# Patient Record
Sex: FEMALE | Race: WHITE | Employment: UNEMPLOYED | URBAN - METROPOLITAN AREA
[De-identification: names, ages, dates, MRNs, and addresses within clinical notes are randomized per-mention and may not be internally consistent; named-entity substitution may affect disease eponyms.]

---

## 2019-07-11 PROBLEM — Z47.89 ORTHOPEDIC AFTERCARE: Status: ACTIVE | Noted: 2019-07-11

## 2022-02-09 PROBLEM — M47.816 LUMBAR FACET ARTHROPATHY: Status: ACTIVE | Noted: 2022-02-09

## 2022-02-09 PROBLEM — M47.816 LUMBAR SPONDYLOSIS: Status: ACTIVE | Noted: 2022-02-09

## 2022-03-21 ENCOUNTER — TELEPHONE (OUTPATIENT)
Dept: ORTHOPEDICS CLINIC | Facility: CLINIC | Age: 62
End: 2022-03-21

## 2022-03-21 NOTE — TELEPHONE ENCOUNTER
Pt stated her pain is in the front at the joint. No previous imaging completed. Xrays ordered. Pt called and notified to come 20-30 minutes before the appointment to have those completed. Pt verbalized understanding. No further questions at this time.

## 2022-12-06 ENCOUNTER — TELEPHONE (OUTPATIENT)
Dept: ORTHOPEDICS CLINIC | Facility: CLINIC | Age: 62
End: 2022-12-06

## 2022-12-06 DIAGNOSIS — M25.551 HIP PAIN, RIGHT: Primary | ICD-10-CM

## 2022-12-06 NOTE — TELEPHONE ENCOUNTER
Patient is coming in for RT HIP. At this time patient has not had any imaging done. Please place X-ray order accordingly, I have notified the patient to  come in earlier prior to appointment to have imaging done. Please forward to Renato Holliday and help schedule xrays. Thank you.      Future Appointments   Date Time Provider Alysa Sofia   1/4/2023  4:00 PM Indra Arnold MD [facility]

## 2023-01-09 ENCOUNTER — TELEPHONE (OUTPATIENT)
Dept: ORTHOPEDICS CLINIC | Facility: CLINIC | Age: 63
End: 2023-01-09

## 2023-01-09 DIAGNOSIS — M25.511 BILATERAL SHOULDER PAIN, UNSPECIFIED CHRONICITY: Primary | ICD-10-CM

## 2023-01-09 DIAGNOSIS — M25.512 BILATERAL SHOULDER PAIN, UNSPECIFIED CHRONICITY: Primary | ICD-10-CM

## 2023-01-09 NOTE — TELEPHONE ENCOUNTER
NP Right Hip Pain and Bilateral Shoulder Pain Please advise if imaging is needed.   Future Appointments   Date Time Provider Alysa Kimberlyn   2/8/2023  2:20 PM Daryl Sherman MD [facility]   2/15/2023 11:00 AM Daryl Sherman MD [facility]

## 2023-02-08 ENCOUNTER — OFFICE VISIT (OUTPATIENT)
Dept: ORTHOPEDICS CLINIC | Facility: CLINIC | Age: 63
End: 2023-02-08
Payer: MEDICARE

## 2023-02-08 ENCOUNTER — HOSPITAL ENCOUNTER (OUTPATIENT)
Dept: GENERAL RADIOLOGY | Age: 63
Discharge: HOME | End: 2023-02-08
Attending: ORTHOPAEDIC SURGERY
Payer: MEDICARE

## 2023-02-08 VITALS — WEIGHT: 190 LBS | HEIGHT: 66 IN | BODY MASS INDEX: 30.53 KG/M2

## 2023-02-08 DIAGNOSIS — M25.551 HIP PAIN, RIGHT: ICD-10-CM

## 2023-02-08 DIAGNOSIS — M43.17 SPONDYLOLISTHESIS AT L5-S1 LEVEL: ICD-10-CM

## 2023-02-08 DIAGNOSIS — M70.61 TROCHANTERIC BURSITIS OF RIGHT HIP: Primary | ICD-10-CM

## 2023-02-08 DIAGNOSIS — Z96.641 HISTORY OF RIGHT HIP REPLACEMENT: ICD-10-CM

## 2023-02-08 PROCEDURE — 73502 X-RAY EXAM HIP UNI 2-3 VIEWS: CPT | Performed by: ORTHOPAEDIC SURGERY

## 2023-02-08 PROCEDURE — 20610 DRAIN/INJ JOINT/BURSA W/O US: CPT | Performed by: ORTHOPAEDIC SURGERY

## 2023-02-08 PROCEDURE — 99203 OFFICE O/P NEW LOW 30 MIN: CPT | Performed by: ORTHOPAEDIC SURGERY

## 2023-02-08 PROCEDURE — 3008F BODY MASS INDEX DOCD: CPT | Performed by: ORTHOPAEDIC SURGERY

## 2023-02-08 RX ORDER — HYDROCODONE BITARTRATE AND ACETAMINOPHEN 5; 325 MG/1; MG/1
TABLET ORAL
COMMUNITY
Start: 2023-02-01

## 2023-02-08 RX ORDER — TRIAMCINOLONE ACETONIDE 40 MG/ML
40 INJECTION, SUSPENSION INTRA-ARTICULAR; INTRAMUSCULAR ONCE
Status: COMPLETED | OUTPATIENT
Start: 2023-02-08 | End: 2023-02-08

## 2023-02-08 RX ADMIN — TRIAMCINOLONE ACETONIDE 40 MG: 40 INJECTION, SUSPENSION INTRA-ARTICULAR; INTRAMUSCULAR at 15:03:00

## 2023-02-08 NOTE — PROGRESS NOTES
Patient is a 41-year-old female here for evaluation of her right hip. Patient states that she had a total hip arthroplasty performed approximately 9 years ago and that the hip has done fairly well but she has had some discomfort with the hip pretty much since the surgery but substantially better than it was before the procedure. Patient does note that recently she had a fall out of a bed landing onto her right hip having some pain. Patient denies groin pain. Patient states the pain is predominantly associated with lying on her right side. She does have some pain however with motion. In addition to this problem she also has been having some pain down her right leg associated with her back. She has had previous x-rays and was told she had spinal stenosis. Patient has been advised to consider surgery. Presently though she is can be going to physical therapy. Patient's exam shows her to have mild to moderate tenderness over the trochanteric bursal region of the right hip. Passive range of motion of the right hip produces no pain. She has a minimal Trendelenburg gait. Neurologically she is intact to light touch lower extremities. Motor exam grossly 5/5. X-rays of her right hip shows the prosthesis to be in good position. Left hip shows minimal degenerative changes. Mild spurring at the trochanteric region of the right hip. Impression is that of trochanteric bursitis of the right hip. Second impression is that of stable right total hip arthroplasty. Third impression is that of spondylolisthesis L5-S1 with stenosis. Recommendation is to go ahead with a cortisone injection in the bursa which was done under sterile technique with 2 cc of Xylocaine and Marcaine and 40 mg of Kenalog. Additionally I recommended that she sees one of the spine specialist to discuss various options.

## 2023-02-13 ENCOUNTER — TELEPHONE (OUTPATIENT)
Dept: ORTHOPEDICS CLINIC | Facility: CLINIC | Age: 63
End: 2023-02-13

## 2023-02-13 DIAGNOSIS — M54.50 LOW BACK PAIN, UNSPECIFIED BACK PAIN LATERALITY, UNSPECIFIED CHRONICITY, UNSPECIFIED WHETHER SCIATICA PRESENT: Primary | ICD-10-CM

## 2023-02-13 NOTE — TELEPHONE ENCOUNTER
XR ordered and scheduled per Ortho protocol.    Called patient to inform them and ask them to arrive 15-20 minutes prior to appointment with .

## 2023-02-13 NOTE — TELEPHONE ENCOUNTER
Patient is coming in for [id number] [facility]   . At this time patient has not had any imaging done. Please place X-ray order accordingly. Please forward to Renato Holliday and help schedule xrays. Thank you.     Future Appointments   Date Time Provider Alysa Kimberlyn   2/15/2023 11:00 AM Daryl Sherman MD [facility]   2/20/2023 10:00 AM Bety Andrade MD [facility]

## 2023-02-15 ENCOUNTER — HOSPITAL ENCOUNTER (OUTPATIENT)
Dept: GENERAL RADIOLOGY | Age: 63
Discharge: HOME | End: 2023-02-15
Attending: ORTHOPAEDIC SURGERY
Payer: MEDICARE

## 2023-02-15 ENCOUNTER — OFFICE VISIT (OUTPATIENT)
Dept: ORTHOPEDICS CLINIC | Facility: CLINIC | Age: 63
End: 2023-02-15
Payer: MEDICARE

## 2023-02-15 VITALS — WEIGHT: 190 LBS | BODY MASS INDEX: 30.53 KG/M2 | HEIGHT: 66 IN

## 2023-02-15 DIAGNOSIS — M19.012 BILATERAL SHOULDER REGION ARTHRITIS: ICD-10-CM

## 2023-02-15 DIAGNOSIS — M25.512 BILATERAL SHOULDER PAIN, UNSPECIFIED CHRONICITY: ICD-10-CM

## 2023-02-15 DIAGNOSIS — M25.811 IMPINGEMENT OF BOTH SHOULDERS: Primary | ICD-10-CM

## 2023-02-15 DIAGNOSIS — M25.511 BILATERAL SHOULDER PAIN, UNSPECIFIED CHRONICITY: ICD-10-CM

## 2023-02-15 DIAGNOSIS — M25.812 IMPINGEMENT OF BOTH SHOULDERS: Primary | ICD-10-CM

## 2023-02-15 DIAGNOSIS — M19.011 BILATERAL SHOULDER REGION ARTHRITIS: ICD-10-CM

## 2023-02-15 PROCEDURE — 73030 X-RAY EXAM OF SHOULDER: CPT | Performed by: ORTHOPAEDIC SURGERY

## 2023-02-15 RX ORDER — TRIAMCINOLONE ACETONIDE 40 MG/ML
40 INJECTION, SUSPENSION INTRA-ARTICULAR; INTRAMUSCULAR ONCE
Status: COMPLETED | OUTPATIENT
Start: 2023-02-15 | End: 2023-02-15

## 2023-02-15 RX ORDER — TRIAMCINOLONE ACETONIDE 40 MG/ML
40 INJECTION, SUSPENSION INTRA-ARTICULAR; INTRAMUSCULAR ONCE
Status: CANCELLED | OUTPATIENT
Start: 2023-02-15 | End: 2023-02-15

## 2023-02-15 RX ADMIN — TRIAMCINOLONE ACETONIDE 40 MG: 40 INJECTION, SUSPENSION INTRA-ARTICULAR; INTRAMUSCULAR at 11:30:00

## 2023-02-15 NOTE — PROGRESS NOTES
Patient is a 18-year-old white female here for follow-up. Patient is actually here for her shoulders. I have seen her for her hip in the past.  I did a trochanteric bursal injection of her hip in the past has been very helpful. Patient has had some problems with her shoulders in the past and has had previous injections which have not been that helpful. Patient has had imaging done elsewhere. I did get x-rays of her shoulders today. Patient states that she has pain lying on her right shoulder. She also is on the left shoulder but the right one is the most painful. Patient also has pain with reaching away from her body. Patient's exam shows a positive impingement sign both shoulders. She has some very minor crepitation with range of motion. Minimal tenderness around the acromioclavicular joint. Fairly good strength of abduction of the shoulders bilaterally. Forward elevation also intact. Neurologically intact upper extremities to light touch. Handgrip strength grossly 5/5. Good cap refill in the upper extremities. X-rays of the shoulder shows mild degenerative changes with inferior osteophytes of the humeral head. Glenohumeral spacing fairly well-maintained though with slight narrowing of the left compared to the right. Subacromial space well-maintained. Mild arthrosis of the acromioclavicular joint. Impression is that of impingement of the right shoulder and left shoulder. 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 Recommendations are to go ahead with cortisone injections. Patient preferred to go ahead just with the right shoulder initially. This was done under sterile technique with 4 cc of Xylocaine and Marcaine and 40 mg of Kenalog through a lateral approach into the subacromial space. Tolerated the injection well.   Patient had some improvement after the injection with range of motion of the shoulder however still having some discomfort at the maximum elevation. This is consistent with her degenerative arthritis. Advised her to follow-up with me in 4 weeks time at which time if she is doing well we might consider an injection of the left shoulder. If she is having some residual pain of the right shoulder might consider an intra-articular cortisone injection of the glenohumeral joint. Patient understands the rationale.

## 2023-03-02 ENCOUNTER — HOSPITAL ENCOUNTER (OUTPATIENT)
Dept: GENERAL RADIOLOGY | Age: 63
Discharge: HOME | End: 2023-03-02
Attending: STUDENT IN AN ORGANIZED HEALTH CARE EDUCATION/TRAINING PROGRAM
Payer: MEDICARE

## 2023-03-02 ENCOUNTER — OFFICE VISIT (OUTPATIENT)
Dept: ORTHOPEDICS CLINIC | Facility: CLINIC | Age: 63
End: 2023-03-02
Payer: MEDICARE

## 2023-03-02 VITALS — WEIGHT: 190 LBS | HEIGHT: 66 IN | BODY MASS INDEX: 30.53 KG/M2

## 2023-03-02 DIAGNOSIS — M54.50 LOW BACK PAIN, UNSPECIFIED BACK PAIN LATERALITY, UNSPECIFIED CHRONICITY, UNSPECIFIED WHETHER SCIATICA PRESENT: ICD-10-CM

## 2023-03-02 DIAGNOSIS — M48.062 LUMBAR STENOSIS WITH NEUROGENIC CLAUDICATION: Primary | ICD-10-CM

## 2023-03-02 PROCEDURE — 99215 OFFICE O/P EST HI 40 MIN: CPT | Performed by: STUDENT IN AN ORGANIZED HEALTH CARE EDUCATION/TRAINING PROGRAM

## 2023-03-02 PROCEDURE — 3008F BODY MASS INDEX DOCD: CPT | Performed by: STUDENT IN AN ORGANIZED HEALTH CARE EDUCATION/TRAINING PROGRAM

## 2023-03-02 PROCEDURE — 72100 X-RAY EXAM L-S SPINE 2/3 VWS: CPT | Performed by: STUDENT IN AN ORGANIZED HEALTH CARE EDUCATION/TRAINING PROGRAM

## 2023-03-10 ENCOUNTER — TELEPHONE (OUTPATIENT)
Dept: ORTHOPEDICS CLINIC | Facility: CLINIC | Age: 63
End: 2023-03-10

## 2023-03-10 NOTE — TELEPHONE ENCOUNTER
Patient called to ask If  had received her MRI images from [facility]. Patient would like a call back.

## 2023-07-17 ENCOUNTER — TELEPHONE (OUTPATIENT)
Dept: ORTHOPEDICS CLINIC | Facility: CLINIC | Age: 63
End: 2023-07-17

## 2023-07-17 DIAGNOSIS — M25.571 RIGHT ANKLE PAIN, UNSPECIFIED CHRONICITY: Primary | ICD-10-CM

## 2023-07-17 DIAGNOSIS — M25.572 LEFT ANKLE PAIN, UNSPECIFIED CHRONICITY: ICD-10-CM

## 2023-07-17 NOTE — TELEPHONE ENCOUNTER
Patient has an upcoming appt for RT Ankle Pain. At this time patient has not had any imaging done, please place RX accordingly. I have notified the patient to come in 20-30 min prior to appointment time to have the imaging done . Please forward to the  pool to schedule imaging. Thank you.       Future Appointments   Date Time Provider Alysa Kimberlyn   8/9/2023 10:40 AM Luli Roberts MD [facility]

## 2023-07-17 NOTE — TELEPHONE ENCOUNTER
Patient made appt via [employer] ([facility]) for rt ankle pain which is a new issue. Please advise if patient can be seen by Vonnie Streeter or need to reschedule with other provider. There are no xrays on file, please place rx if needed.

## 2023-07-17 NOTE — TELEPHONE ENCOUNTER
Please update order. Confirmed with patient that it is the LEFT ANKLE and switched to Dr Dwight Amador.    Future Appointments   Date Time Provider Alysa Kimberlyn   8/14/2023 10:00 AM Janette Comer MD [facility]

## (undated) DIAGNOSIS — M25.552 LEFT HIP PAIN: Primary | ICD-10-CM